# Patient Record
Sex: FEMALE | Race: WHITE | NOT HISPANIC OR LATINO | Employment: FULL TIME | URBAN - METROPOLITAN AREA
[De-identification: names, ages, dates, MRNs, and addresses within clinical notes are randomized per-mention and may not be internally consistent; named-entity substitution may affect disease eponyms.]

---

## 2017-07-05 NOTE — TELEPHONE ENCOUNTER
Fax received from pharmacy for refill of Lisinopril.  Last pap 6/27/16  Next appt 9/7/17 devin   Chart scanned

## 2017-07-20 RX ORDER — LISINOPRIL AND HYDROCHLOROTHIAZIDE 20; 12.5 MG/1; MG/1
1 TABLET ORAL DAILY
Qty: 30 TABLET | Refills: 0 | Status: SHIPPED | OUTPATIENT
Start: 2017-07-20 | End: 2017-10-01 | Stop reason: SDUPTHER

## 2017-09-07 ENCOUNTER — OFFICE VISIT (OUTPATIENT)
Dept: OBSTETRICS AND GYNECOLOGY | Facility: CLINIC | Age: 37
End: 2017-09-07

## 2017-09-07 VITALS
HEIGHT: 72 IN | DIASTOLIC BLOOD PRESSURE: 82 MMHG | SYSTOLIC BLOOD PRESSURE: 124 MMHG | BODY MASS INDEX: 39.68 KG/M2 | WEIGHT: 293 LBS

## 2017-09-07 DIAGNOSIS — N63.0 BREAST LUMP: ICD-10-CM

## 2017-09-07 DIAGNOSIS — Z01.419 WELL WOMAN EXAM WITH ROUTINE GYNECOLOGICAL EXAM: Primary | ICD-10-CM

## 2017-09-07 PROCEDURE — 99395 PREV VISIT EST AGE 18-39: CPT | Performed by: NURSE PRACTITIONER

## 2017-09-07 NOTE — PROGRESS NOTES
[facility], [place], operated by [facility]  Routine Annual Visit    2017    Patient: Loree Johnson          MR#:[id number]      History of Present Illness    37 y.o. female  who presents for annual exam. Last annual 2016 with . Pap negative HPV positive. Using tubal ligation for contraception. Reports monthly periods but very heavy .Reports soaking through tampons and pads every several hours. Has been on OCPs and mirena in the past. Did not do well with mirena per pt. She has no other complaints today.    Patient's last menstrual period was 2017 (within days).  Obstetric History:  OB History      Para Term  AB TAB SAB Ectopic Multiple Living    5 4 4  1  1   4         Menstrual History:     Patient's last menstrual period was 2017 (within days).       Sexual History:       ________________________________________  There is no problem list on file for this patient.      Past Medical History:   Diagnosis Date   • Dermoid cyst     Left ovary, status post cystectomy   • History of  section     ×4   • Hypertension    • Obesity, Class III, BMI 40-49.9 (morbid obesity)        Past Surgical History:   Procedure Laterality Date   •  SECTION  2007    Dr. Hsieh   •  SECTION  2012    Dr. Hsieh   •  SECTION PRIMARY  2003    Dr. Colunga   •  SECTION WITH TUBAL Bilateral 2015    Dr. Hsieh   • ELBOW PROCEDURE      [facility]   • LAPAROSCOPIC OVARIAN CYSTECTOMY Right 2003    Dr. Hsieh dermoid tumor       History   Smoking Status   • Never Smoker   Smokeless Tobacco   • Never Used       Family History   Problem Relation Age of Onset   • Throat cancer Mother    • Hypertension Mother    • Hypertension Maternal Grandmother    • Throat cancer Maternal Grandfather    • Ovarian cancer Cousin    • Lymphoma Cousin    • Hypertension Maternal Aunt    • No Known Problems Daughter    • No Known Problems Daughter    • No Known Problems  Daughter    • No Known Problems Son        Prior to Admission medications    Medication Sig Start Date End Date Taking? Authorizing Provider   lisinopril-hydrochlorothiazide (PRINZIDE,ZESTORETIC) 20-12.5 MG per tablet Take 1 tablet by mouth Daily. Take 1 tablet daily. 7/20/17   CASSI Webb   baclofen (LIORESAL) 10 MG tablet Take one tablet po bid prn pain or spasm (One in the AM and repeat in 8 hours) 5/17/17 9/7/17  CASSI Casillas   cyclobenzaprine (FLEXERIL) 10 MG tablet Take 1/2 tab - 1 tablet at HS prn pain/spasm 5/17/17 9/7/17  CASSI Casillas   HYDROcodone-acetaminophen (NORCO) 5-325 MG per tablet Take one or two tablets every 4-6 hours as needed for pain 5/17/17 9/7/17  CASSI Casillas     ________________________________________    Current contraception: tubal ligation  History of abnormal Pap smear: yes - pap neg + HPV  Family history of uterine or ovarian cancer: no  Family History of colon cancer/colon polyps: no  History of abnormal mammogram: no  History of abnormal lipids: no    The following portions of the patient's history were reviewed and updated as appropriate: allergies, current medications, past family history, past medical history, past social history, past surgical history and problem list.    Review of Systems   Constitutional: Negative.    HENT: Negative.    Eyes: Negative for visual disturbance.   Respiratory: Negative for cough, shortness of breath and wheezing.    Cardiovascular: Negative for chest pain, palpitations and leg swelling.   Gastrointestinal: Negative for abdominal distention, abdominal pain, blood in stool, constipation, diarrhea, nausea and vomiting.   Endocrine: Negative for cold intolerance and heat intolerance.   Genitourinary: Positive for menstrual problem. Negative for difficulty urinating, dyspareunia, dysuria, frequency, genital sores, hematuria, pelvic pain, urgency, vaginal bleeding, vaginal discharge and vaginal pain.  "  Musculoskeletal: Negative.    Skin: Negative.    Neurological: Negative for dizziness, weakness, light-headedness, numbness and headaches.   Hematological: Negative.    Psychiatric/Behavioral: Negative.    Breasts: negative for lumps skin changes, dimpling, swelling, nipple changes/discharge bilaterally         Objective   Physical Exam    /82  Ht 71.5\" (181.6 cm)  Wt (!) 329 lb (149 kg)  LMP 08/21/2017 (Within Days)  Breastfeeding? No  BMI 45.25 kg/m2   BP Readings from Last 3 Encounters:   09/07/17 124/82   05/17/17 136/91   07/15/16 132/82      Wt Readings from Last 3 Encounters:   09/07/17 (!) 329 lb (149 kg)   05/17/17 (!) 319 lb (145 kg)   07/15/16 (!) 316 lb (143 kg)        BMI: Estimated body mass index is 45.25 kg/(m^2) as calculated from the following:    Height as of this encounter: 71.5\" (181.6 cm).    Weight as of this encounter: 329 lb (149 kg).      General:   alert, appears stated age and cooperative   Heart: regular rate and rhythm, S1, S2 normal, no murmur, click, rub or gallop   Lungs: clear to auscultation bilaterally   Abdomen: soft, non-tender, without masses or organomegaly   Breast: negative findings: normal in size and symmetry, normal contour with no evidence of flattening or dimpling, skin normal, nipples everted without rashes or discharge, no palpable axillary lymphadenopathy, . and positive findings: round, mobile and tender nodule located on the left upper outer quadrant   Vulva: normal   Vagina: normal mucosa   Cervix: no bleeding following Pap, no cervical motion tenderness and no lesions   Uterus: normal size, mobile, non-tender or normal shape and consistency   Adnexa: exam limited by habitus     Assessment:    1. Normal annual exam. Await pap results. Pt aware if HPV + again- colpo to be arranged  2. Breast lump- bilateral diagnostic mammogram with U/S prn to be arranged   3. Menorrhagia- consult with Dr. Colunga; pt interested in ablation  4.  Counseled on healthy " lifestyle modifications and self breast exams.      Plan:    []  Rx:   [x]  Mammogram request made  [x]  PAP done  []  Occult fecal blood test ([facility])  []  Labs:   []  GC/Chl/TV  []  DEXA scan   []  Referral for colonoscopy:           CASSI Vides  9/7/2017 8:53 AM

## 2017-09-12 LAB
CYTOLOGIST CVX/VAG CYTO: NORMAL
CYTOLOGY CVX/VAG DOC THIN PREP: NORMAL
DX ICD CODE: NORMAL
HIV 1 & 2 AB SER-IMP: NORMAL
HPV I/H RISK 4 DNA CVX QL PROBE+SIG AMP: NEGATIVE
OTHER STN SPEC: NORMAL
PATH REPORT.FINAL DX SPEC: NORMAL
STAT OF ADQ CVX/VAG CYTO-IMP: NORMAL

## 2017-09-13 ENCOUNTER — HOSPITAL ENCOUNTER (OUTPATIENT)
Dept: MAMMOGRAPHY | Facility: HOSPITAL | Age: 37
Discharge: HOME | End: 2017-09-13
Admitting: NURSE PRACTITIONER

## 2017-09-13 ENCOUNTER — TELEPHONE (OUTPATIENT)
Dept: OBSTETRICS AND GYNECOLOGY | Facility: CLINIC | Age: 37
End: 2017-09-13

## 2017-09-13 ENCOUNTER — HOSPITAL ENCOUNTER (OUTPATIENT)
Dept: ULTRASOUND IMAGING | Facility: HOSPITAL | Age: 37
Discharge: HOME | End: 2017-09-13

## 2017-09-13 DIAGNOSIS — N63.0 BREAST LUMP: ICD-10-CM

## 2017-09-13 PROCEDURE — G0204 DX MAMMO INCL CAD BI: HCPCS

## 2017-09-13 PROCEDURE — 76642 ULTRASOUND BREAST LIMITED: CPT

## 2017-09-13 NOTE — TELEPHONE ENCOUNTER
----- Message from CASSI Webb sent at 9/13/2017  9:36 AM EDT -----  Please inform patient her pap smear returned negative (normal) and HPV negative. Thank you.

## 2017-09-19 ENCOUNTER — TELEPHONE (OUTPATIENT)
Dept: OBSTETRICS AND GYNECOLOGY | Facility: CLINIC | Age: 37
End: 2017-09-19

## 2017-10-02 RX ORDER — LISINOPRIL AND HYDROCHLOROTHIAZIDE 20; 12.5 MG/1; MG/1
TABLET ORAL
Qty: 30 TABLET | Refills: 0 | Status: SHIPPED | OUTPATIENT
Start: 2017-10-02 | End: 2017-10-25

## 2017-10-25 ENCOUNTER — CONSULT (OUTPATIENT)
Dept: OBSTETRICS AND GYNECOLOGY | Facility: CLINIC | Age: 37
End: 2017-10-25

## 2017-10-25 VITALS
HEIGHT: 72 IN | DIASTOLIC BLOOD PRESSURE: 90 MMHG | WEIGHT: 293 LBS | SYSTOLIC BLOOD PRESSURE: 138 MMHG | BODY MASS INDEX: 39.68 KG/M2

## 2017-10-25 DIAGNOSIS — I10 ESSENTIAL HYPERTENSION: ICD-10-CM

## 2017-10-25 DIAGNOSIS — N93.9 ABNORMAL UTERINE BLEEDING (AUB): Primary | ICD-10-CM

## 2017-10-25 PROCEDURE — 99213 OFFICE O/P EST LOW 20 MIN: CPT | Performed by: OBSTETRICS & GYNECOLOGY

## 2017-10-25 RX ORDER — LISINOPRIL AND HYDROCHLOROTHIAZIDE 20; 12.5 MG/1; MG/1
1 TABLET ORAL DAILY
Qty: 30 TABLET | Refills: 12 | Status: SHIPPED | OUTPATIENT
Start: 2017-10-25 | End: 2017-12-20 | Stop reason: SDUPTHER

## 2017-10-25 NOTE — PROGRESS NOTES
[facility]     10/25/2017      Patient:  Loree Johnson   MR#:[id number]    Office note    CC: heavy periods     Subjective     History of Present Illness  37 y.o. female  presents for consult on endometrial ablation.  For the past 6 months or so the patient reports exceptionally heavy periods lasting 8 days with heavy flow through 8 of those days requiring double protection the patient had an ultrasound last year that was essentially unremarkable.  The patient denies any intermenstrual bleeding and states that her cycles are extremely regular.  The patient knows of people that have had endometrial ablation and she wishes to have that procedure performed.    The patient had normal blood pressure with her last annual check and wanted to go of her antihypertensive medication.  We discussed her blood pressures today and we'll reinstitute her lisinopril that offered good control of her pressure previously      Patient Active Problem List   Diagnosis   • Essential hypertension   • Abnormal uterine bleeding (AUB)   • Essential hypertension       Past Medical History:   Diagnosis Date   • Dermoid cyst     Left ovary, status post cystectomy   • History of  section     ×4   • Hypertension    • Obesity, Class III, BMI 40-49.9 (morbid obesity)        Past Surgical History:   Procedure Laterality Date   •  SECTION  2007    Dr. Hsieh   •  SECTION  2012    Dr. Hsieh   •  SECTION PRIMARY  2003    Dr. Colunga   •  SECTION WITH TUBAL Bilateral 2015    Dr. Hsieh   • ELBOW PROCEDURE      [facility]   • LAPAROSCOPIC OVARIAN CYSTECTOMY Right 2003    Dr. Hsieh dermoid tumor       Obstetric History:  OB History      Para Term  AB Living    5 4 4  1 4    SAB TAB Ectopic Multiple Live Births    1    4         Menstrual History:     Patient's last menstrual period was 10/18/2017.       #: 1, Date: , Sex: None, Weight: None, GA:  None, Delivery: None, Apgar1: None, Apgar5: None, Living: None, Birth Comments: None    #: 2, Date: 03, Sex: Female, Weight: 6 lb 8.2 oz (2.954 kg), GA: 40w4d, Delivery: , Low Transverse, Apgar1: 9, Apgar5: 9, Living: Living, Birth Comments: None    #: 3, Date: 07, Sex: Male, Weight: 8 lb 13 oz (3.997 kg), GA: 38w0d, Delivery: , Low Transverse, Apgar1: 8, Apgar5: 9, Living: Living, Birth Comments: None    #: 4, Date: 12, Sex: Female, Weight: 7 lb 12.9 oz (3.541 kg), GA: 39w1d, Delivery: , Low Transverse, Apgar1: 8, Apgar5: 9, Living: Living, Birth Comments: None    #: 5, Date: 04/07/15, Sex: Female, Weight: 5 lb 4.7 oz (2.401 kg), GA: 37w3d, Delivery: , Low Transverse, Apgar1: 8, Apgar5: 8, Living: Living, Birth Comments: None      Family History   Problem Relation Age of Onset   • Throat cancer Mother 54   • Hypertension Mother    • Lung cancer Mother 54   • Hypertension Maternal Grandmother    • Throat cancer Maternal Grandfather 60   • Ovarian cancer Cousin 28   • Lymphoma Cousin    • Hypertension Maternal Aunt    • No Known Problems Daughter    • No Known Problems Daughter    • No Known Problems Daughter    • No Known Problems Son    • Breast cancer Neg Hx    • Uterine cancer Neg Hx    • Colon cancer Neg Hx    • Melanoma Neg Hx    • Prostate cancer Neg Hx        Social History   Substance Use Topics   • Smoking status: Never Smoker   • Smokeless tobacco: Never Used   • Alcohol use Yes      Comment: x2 annually       Review of patient's allergies indicates no known allergies.      Current Outpatient Prescriptions:   •  lisinopril-hydrochlorothiazide (ZESTORETIC) 20-12.5 MG per tablet, Take 1 tablet by mouth Daily., Disp: 30 tablet, Rfl: 12    The following portions of the patient's history were reviewed and updated as appropriate: allergies, current medications, past family history, past medical history, past social history, past surgical history and problem  "list.    Review of Systems   Constitutional: Negative.    Respiratory: Negative.    Cardiovascular: Negative.    Gastrointestinal: Negative.    Genitourinary: Positive for menstrual problem.   Psychiatric/Behavioral: Negative.        BP Readings from Last 3 Encounters:   10/25/17 138/90   09/07/17 124/82   05/17/17 136/91      Wt Readings from Last 3 Encounters:   10/25/17 (!) 330 lb (150 kg)   09/07/17 (!) 329 lb (149 kg)   05/17/17 (!) 319 lb (145 kg)      BMI: Estimated body mass index is 45.38 kg/(m^2) as calculated from the following:    Height as of this encounter: 71.5\" (181.6 cm).    Weight as of this encounter: 330 lb (150 kg).  BSA: Estimated body surface area is 2.62 meters squared as calculated from the following:    Height as of this encounter: 71.5\" (181.6 cm).    Weight as of this encounter: 330 lb (150 kg).    Objective   Physical Exam   Constitutional: She is oriented to person, place, and time. She appears well-developed and well-nourished.   Cardiovascular: Normal rate and regular rhythm.    Pulmonary/Chest: Effort normal and breath sounds normal.   Abdominal: Soft. Bowel sounds are normal. She exhibits no distension and no mass. There is no tenderness.   Morbid obesity     Genitourinary: Vagina normal and uterus normal.   Genitourinary Comments: No bleeding     Neurological: She is alert and oriented to person, place, and time.   Psychiatric: She has a normal mood and affect. Her behavior is normal. Judgment and thought content normal.   Nursing note and vitals reviewed.        Assessment/Plan     Loree was seen today for consult.    Diagnoses and all orders for this visit:    Abnormal uterine bleeding (AUB)  -     Case Request: Hysteroscopy D&C endometrial ablation via [facility]  - Previous tubal ligation.    The procedure is discussed with the patient.  We discussed the risks including but not limited to bleeding, infection and damage to internal organs.    Understanding of the surgical " procedure is voiced.    Essential hypertension    Other orders  -     lisinopril-hydrochlorothiazide (ZESTORETIC) 20-12.5 MG per tablet; Take 1 tablet by mouth Daily.          No Follow-up on file.        Diaz Colunga MD   10/25/2017 2:40 PM

## 2017-10-31 ENCOUNTER — TELEPHONE (OUTPATIENT)
Dept: OBSTETRICS AND GYNECOLOGY | Facility: CLINIC | Age: 37
End: 2017-10-31

## 2017-10-31 NOTE — TELEPHONE ENCOUNTER
----- Message from CASSI Webb sent at 10/30/2017  3:58 PM EDT -----  I have attempted to reach this patient without success.   Please inform pt her diagnostic mammogram and U/S returned negative. If she has any concerns please let me know- we can send her to breast specialist for further evaluation of the area if she would like. Otherwise, routine mammogram is recommended.

## 2017-12-15 ENCOUNTER — OFFICE VISIT (OUTPATIENT)
Dept: OBSTETRICS AND GYNECOLOGY | Facility: CLINIC | Age: 37
End: 2017-12-15

## 2017-12-15 VITALS
SYSTOLIC BLOOD PRESSURE: 124 MMHG | HEIGHT: 71 IN | DIASTOLIC BLOOD PRESSURE: 80 MMHG | WEIGHT: 293 LBS | BODY MASS INDEX: 41.02 KG/M2

## 2017-12-15 DIAGNOSIS — I10 ESSENTIAL HYPERTENSION: ICD-10-CM

## 2017-12-15 DIAGNOSIS — E66.01 OBESITY, MORBID, BMI 40.0-49.9 (HCC): ICD-10-CM

## 2017-12-15 DIAGNOSIS — N93.9 ABNORMAL UTERINE BLEEDING (AUB): Primary | ICD-10-CM

## 2017-12-15 PROCEDURE — 99213 OFFICE O/P EST LOW 20 MIN: CPT | Performed by: OBSTETRICS & GYNECOLOGY

## 2017-12-15 NOTE — PROGRESS NOTES
[facility] ([facility]     12/15/2017      Patient:  Loree Johnson   MR#:[id number]    Office note    CC: Preoperative consult for endometrial ablation    Subjective     History of Present Illness  37 y.o. female  with continued problematic heavy bleeding.  She reports for the past 8 months exceptionally heavy periods lasting 8 days with heavy flow through 8 of those days requiring double protection.   The patient had an ultrasound last year that was essentially unremarkable.  The patient denies any intermenstrual bleeding and states that her cycles are extremely regular.   The patient is scheduled for endometrial ablation 17    Patient Active Problem List   Diagnosis   • Essential hypertension   • Abnormal uterine bleeding (AUB)   • Essential hypertension       Past Medical History:   Diagnosis Date   • Dermoid cyst     Left ovary, status post cystectomy   • History of  section     ×4   • Hypertension    • Obesity, Class III, BMI 40-49.9 (morbid obesity)        Past Surgical History:   Procedure Laterality Date   •  SECTION  2007    Dr. Hsieh   •  SECTION  2012    Dr. Hsieh   •  SECTION PRIMARY  2003    Dr. Colunga   •  SECTION WITH TUBAL Bilateral 2015    Dr. Hsieh   • ELBOW PROCEDURE      [facility]   • LAPAROSCOPIC OVARIAN CYSTECTOMY Right 2003    Dr. Hsieh dermoid tumor       Obstetric History:  OB History      Para Term  AB Living    5 4 4  1 4    SAB TAB Ectopic Multiple Live Births    1    4         Menstrual History:     Patient's last menstrual period was 2017.       #: 1, Date: , Sex: None, Weight: None, GA: None, Delivery: None, Apgar1: None, Apgar5: None, Living: None, Birth Comments: None    #: 2, Date: 03, Sex: Female, Weight: 2954 g (6 lb 8.2 oz), GA: 40w4d, Delivery: , Low Transverse, Apgar1: 9, Apgar5: 9, Living: Living, Birth Comments: None    #: 3, Date: 07, Sex:  Male, Weight: 3997 g (8 lb 13 oz), GA: 38w0d, Delivery: , Low Transverse, Apgar1: 8, Apgar5: 9, Living: Living, Birth Comments: None    #: 4, Date: 12, Sex: Female, Weight: 3541 g (7 lb 12.9 oz), GA: 39w1d, Delivery: , Low Transverse, Apgar1: 8, Apgar5: 9, Living: Living, Birth Comments: None    #: 5, Date: 04/07/15, Sex: Female, Weight: 2401 g (5 lb 4.7 oz), GA: 37w3d, Delivery: , Low Transverse, Apgar1: 8, Apgar5: 8, Living: Living, Birth Comments: None      Family History   Problem Relation Age of Onset   • Throat cancer Mother 54   • Hypertension Mother    • Lung cancer Mother 54   • Hypertension Maternal Grandmother    • Throat cancer Maternal Grandfather 60   • Ovarian cancer Cousin 28   • Lymphoma Cousin    • Hypertension Maternal Aunt    • No Known Problems Daughter    • No Known Problems Daughter    • No Known Problems Daughter    • No Known Problems Son    • Breast cancer Neg Hx    • Uterine cancer Neg Hx    • Colon cancer Neg Hx    • Melanoma Neg Hx    • Prostate cancer Neg Hx        Social History   Substance Use Topics   • Smoking status: Never Smoker   • Smokeless tobacco: Never Used   • Alcohol use Yes      Comment: x2 annually       Review of patient's allergies indicates no known allergies.      Current Outpatient Prescriptions:   •  lisinopril-hydrochlorothiazide (ZESTORETIC) 20-12.5 MG per tablet, Take 1 tablet by mouth Daily., Disp: 30 tablet, Rfl: 12    The following portions of the patient's history were reviewed and updated as appropriate: allergies, current medications, past family history, past medical history, past social history, past surgical history and problem list.    Review of Systems   Constitutional: Positive for fatigue.   Respiratory: Negative.    Cardiovascular: Negative.    Gastrointestinal: Negative.    Genitourinary: Positive for menstrual problem and vaginal bleeding.   Psychiatric/Behavioral: Negative.        BP Readings from Last 3  "Encounters:   12/15/17 124/80   12/11/17 126/81   10/25/17 138/90      Wt Readings from Last 3 Encounters:   12/15/17 (!) 150 kg (331 lb)   12/11/17 (!) 145 kg (319 lb)   10/25/17 (!) 150 kg (330 lb)      BMI: Estimated body mass index is 46.17 kg/(m^2) as calculated from the following:    Height as of this encounter: 180.3 cm (71\").    Weight as of this encounter: 150 kg (331 lb).  BSA: Estimated body surface area is 2.61 meters squared as calculated from the following:    Height as of this encounter: 180.3 cm (71\").    Weight as of this encounter: 150 kg (331 lb).    Objective   Physical Exam   Constitutional: She is oriented to person, place, and time. She appears well-developed and well-nourished.   HENT:   Head: Normocephalic and atraumatic.   Cardiovascular: Normal rate.    Pulmonary/Chest: Effort normal.   Abdominal: Soft. Bowel sounds are normal. She exhibits no distension. There is no tenderness.   Morbidly obese     Genitourinary: Vagina normal and uterus normal.   Genitourinary Comments: Normal uterus   Exam very limited by habitus   Neurological: She is alert and oriented to person, place, and time.   Skin: Skin is warm and dry.   Psychiatric: She has a normal mood and affect. Her behavior is normal. Judgment and thought content normal.   Nursing note and vitals reviewed.        Assessment/Plan     Loree was seen today for consult.    Diagnoses and all orders for this visit:    Abnormal uterine bleeding (AUB)  - Proceed with hysteroscopy D&C endometrial ablation scheduled 12/21/17    Essential hypertension    Obesity, morbid, BMI 40.0-49.9          No Follow-up on file.        Diaz Colunga MD   12/15/2017 2:32 PM  "

## 2017-12-19 ENCOUNTER — APPOINTMENT (OUTPATIENT)
Dept: PREADMISSION TESTING | Facility: HOSPITAL | Age: 37
End: 2017-12-19

## 2017-12-20 ENCOUNTER — APPOINTMENT (OUTPATIENT)
Dept: PREADMISSION TESTING | Facility: HOSPITAL | Age: 37
End: 2017-12-20

## 2017-12-20 VITALS
BODY MASS INDEX: 39.68 KG/M2 | HEART RATE: 95 BPM | RESPIRATION RATE: 20 BRPM | SYSTOLIC BLOOD PRESSURE: 135 MMHG | HEIGHT: 72 IN | DIASTOLIC BLOOD PRESSURE: 86 MMHG | OXYGEN SATURATION: 100 % | WEIGHT: 293 LBS | TEMPERATURE: 98 F

## 2017-12-20 LAB
ANION GAP SERPL CALCULATED.3IONS-SCNC: 14.7 MMOL/L
BUN BLD-MCNC: 11 MG/DL (ref 6–20)
BUN/CREAT SERPL: 15.1 (ref 7–25)
CALCIUM SPEC-SCNC: 9 MG/DL (ref 8.6–10.5)
CHLORIDE SERPL-SCNC: 96 MMOL/L (ref 98–107)
CO2 SERPL-SCNC: 26.3 MMOL/L (ref 22–29)
CREAT BLD-MCNC: 0.73 MG/DL (ref 0.57–1)
DEPRECATED RDW RBC AUTO: 44.4 FL (ref 37–54)
ERYTHROCYTE [DISTWIDTH] IN BLOOD BY AUTOMATED COUNT: 16.1 % (ref 11.7–13)
GFR SERPL CREATININE-BSD FRML MDRD: 91 ML/MIN/1.73
GLUCOSE BLD-MCNC: 136 MG/DL (ref 65–99)
HCG SERPL QL: NEGATIVE
HCT VFR BLD AUTO: 38.5 % (ref 35.6–45.5)
HGB BLD-MCNC: 11.9 G/DL (ref 11.9–15.5)
MCH RBC QN AUTO: 23.7 PG (ref 26.9–32)
MCHC RBC AUTO-ENTMCNC: 30.9 G/DL (ref 32.4–36.3)
MCV RBC AUTO: 76.5 FL (ref 80.5–98.2)
PLATELET # BLD AUTO: 292 10*3/MM3 (ref 140–500)
PMV BLD AUTO: 9.5 FL (ref 6–12)
POTASSIUM BLD-SCNC: 3.3 MMOL/L (ref 3.5–5.2)
RBC # BLD AUTO: 5.03 10*6/MM3 (ref 3.9–5.2)
SODIUM BLD-SCNC: 137 MMOL/L (ref 136–145)
WBC NRBC COR # BLD: 7.43 10*3/MM3 (ref 4.5–10.7)

## 2017-12-20 PROCEDURE — 36415 COLL VENOUS BLD VENIPUNCTURE: CPT

## 2017-12-20 PROCEDURE — 93010 ELECTROCARDIOGRAM REPORT: CPT | Performed by: INTERNAL MEDICINE

## 2017-12-20 PROCEDURE — 85027 COMPLETE CBC AUTOMATED: CPT | Performed by: OBSTETRICS & GYNECOLOGY

## 2017-12-20 PROCEDURE — 84703 CHORIONIC GONADOTROPIN ASSAY: CPT | Performed by: OBSTETRICS & GYNECOLOGY

## 2017-12-20 PROCEDURE — 80048 BASIC METABOLIC PNL TOTAL CA: CPT | Performed by: OBSTETRICS & GYNECOLOGY

## 2017-12-20 PROCEDURE — 93005 ELECTROCARDIOGRAM TRACING: CPT

## 2017-12-20 RX ORDER — LISINOPRIL AND HYDROCHLOROTHIAZIDE 20; 12.5 MG/1; MG/1
1 TABLET ORAL NIGHTLY
COMMUNITY
End: 2018-10-25

## 2017-12-20 RX ORDER — POTASSIUM CHLORIDE 750 MG/1
40 CAPSULE, EXTENDED RELEASE ORAL ONCE
Status: COMPLETED | OUTPATIENT
Start: 2017-12-21 | End: 2017-12-21

## 2017-12-20 NOTE — DISCHARGE INSTRUCTIONS
Take the following medications the morning of surgery with a small sip of water: NONE  STOP PREOP ANY ANTIINFLAMMATORY, HERBAL, IBUPROFEN, ETC  ARRIVE TO THE MAIN SURGERY DESK THE DAY OF YOUR SURGERY BY 530AM.        General Instructions:  • Do not eat solid food after midnight the night before surgery.  • You may drink clear liquids day of surgery but must stop at least one hour before your hospital arrival time.  • It is beneficial for you to have a clear drink that contains carbohydrates the day of surgery.  We suggest a 12 to 20 ounce bottle of Gatorade or Powerade for non-diabetic patients or a 12 to 20 ounce bottle of G2 or Powerade Zero for diabetic patients. (Pediatric patients, are not advised to drink a 12 to 20 ounce carbohydrate drink)    Clear liquids are liquids you can see through.  Nothing red in color.     Plain water                               Sports drinks  Sodas                                   Gelatin (Jell-O)  Fruit juices without pulp such as white grape juice and apple juice  Popsicles that contain no fruit or yogurt  Tea or coffee (no cream or milk added)  Gatorade / Powerade  G2 / Powerade Zero    • Infants may have breast milk up to four hours before surgery.  • Infants drinking formula may drink formula up to six hours before surgery.   • Patients who avoid smoking, chewing tobacco and alcohol for 4 weeks prior to surgery have a reduced risk of post-operative complications.  Quit smoking as many days before surgery as you can.  • Do not smoke, use chewing tobacco or drink alcohol the day of surgery.   • If applicable bring your C-PAP/ BI-PAP machine.  • Bring any papers given to you in the doctor’s office.  • Wear clean comfortable clothes and socks.  • Do not wear contact lenses or make-up.  Bring a case for your glasses.   • Bring crutches or walker if applicable.  • Remove all piercings.  Leave jewelry and any other valuables at home.  • The Pre-Admission Testing nurse will  instruct you to bring medications if unable to obtain an accurate list in Pre-Admission Testing.        If you were given a blood bank ID arm band remember to bring it with you the day of surgery.    Preventing a Surgical Site Infection:  • For 2 to 3 days before surgery, avoid shaving with a razor because the razor can irritate skin and make it easier to develop an infection.  • The night prior to surgery sleep in a clean bed with clean clothing.  Do not allow pets to sleep with you.  • Shower on the morning of surgery using a fresh bar of anti-bacterial soap (such as Dial) and clean washcloth.  Dry with a clean towel and dress in clean clothing.  • Ask your surgeon if you will be receiving antibiotics prior to surgery.  • Make sure you, your family, and all healthcare providers clean their hands with soap and water or an alcohol based hand  before caring for you or your wound.    Day of surgery:  Upon arrival, a Pre-op nurse and Anesthesiologist will review your health history, obtain vital signs, and answer questions you may have.  The only belongings needed at this time will be your home medications and if applicable your C-PAP/BI-PAP machine.  If you are staying overnight your family can leave the rest of your belongings in the car and bring them to your room later.  A Pre-op nurse will start an IV and you may receive medication in preparation for surgery, including something to help you relax.  Your family will be able to see you in the Pre-op area.  While you are in surgery your family should notify the waiting room  if they leave the waiting room area and provide a contact phone number.    Please be aware that surgery does come with discomfort.  We want to make every effort to control your discomfort so please discuss any uncontrolled symptoms with your nurse.   Your doctor will most likely have prescribed pain medications.      If you are going home after surgery you will receive  individualized written care instructions before being discharged.  A responsible adult must drive you to and from the hospital on the day of your surgery and stay with you for 24 hours.    If you are staying overnight following surgery, you will be transported to your hospital room following the recovery period.  [facility] has all private rooms.    If you have any questions please call Pre-Admission Testing at [phone].  Deductibles and co-payments are collected on the day of service. Please be prepared to pay the required co-pay, deductible or deposit on the day of service as defined by your plan.

## 2017-12-21 ENCOUNTER — ANESTHESIA (OUTPATIENT)
Dept: PERIOP | Facility: HOSPITAL | Age: 37
End: 2017-12-21

## 2017-12-21 ENCOUNTER — ANESTHESIA EVENT (OUTPATIENT)
Dept: PERIOP | Facility: HOSPITAL | Age: 37
End: 2017-12-21

## 2017-12-21 ENCOUNTER — HOSPITAL ENCOUNTER (OUTPATIENT)
Facility: HOSPITAL | Age: 37
Setting detail: OBSERVATION
Discharge: HOME | End: 2017-12-21
Attending: OBSTETRICS & GYNECOLOGY | Admitting: OBSTETRICS & GYNECOLOGY

## 2017-12-21 VITALS
WEIGHT: 293 LBS | DIASTOLIC BLOOD PRESSURE: 78 MMHG | TEMPERATURE: 97.5 F | HEART RATE: 74 BPM | BODY MASS INDEX: 39.68 KG/M2 | HEIGHT: 72 IN | RESPIRATION RATE: 16 BRPM | SYSTOLIC BLOOD PRESSURE: 123 MMHG | OXYGEN SATURATION: 98 %

## 2017-12-21 DIAGNOSIS — N93.9 ABNORMAL UTERINE BLEEDING (AUB): Primary | ICD-10-CM

## 2017-12-21 PROCEDURE — G0378 HOSPITAL OBSERVATION PER HR: HCPCS

## 2017-12-21 PROCEDURE — 25010000003 CEFAZOLIN IN DEXTROSE 2-4 GM/100ML-% SOLUTION: Performed by: OBSTETRICS & GYNECOLOGY

## 2017-12-21 PROCEDURE — 25010000002 PROMETHAZINE PER 50 MG: Performed by: NURSE ANESTHETIST, CERTIFIED REGISTERED

## 2017-12-21 PROCEDURE — 25010000002 DEXAMETHASONE PER 1 MG: Performed by: ANESTHESIOLOGY

## 2017-12-21 PROCEDURE — 25010000002 PROPOFOL 10 MG/ML EMULSION: Performed by: ANESTHESIOLOGY

## 2017-12-21 PROCEDURE — 25010000002 MORPHINE SULFATE (PF) 2 MG/ML SOLUTION: Performed by: ANESTHESIOLOGY

## 2017-12-21 PROCEDURE — 88305 TISSUE EXAM BY PATHOLOGIST: CPT | Performed by: OBSTETRICS & GYNECOLOGY

## 2017-12-21 PROCEDURE — 58563 HYSTEROSCOPY ABLATION: CPT | Performed by: OBSTETRICS & GYNECOLOGY

## 2017-12-21 PROCEDURE — 25010000002 FENTANYL CITRATE (PF) 100 MCG/2ML SOLUTION: Performed by: ANESTHESIOLOGY

## 2017-12-21 PROCEDURE — 25010000002 MIDAZOLAM PER 1 MG: Performed by: ANESTHESIOLOGY

## 2017-12-21 PROCEDURE — 25010000002 KETOROLAC TROMETHAMINE PER 15 MG: Performed by: ANESTHESIOLOGY

## 2017-12-21 PROCEDURE — 25010000002 ONDANSETRON PER 1 MG: Performed by: ANESTHESIOLOGY

## 2017-12-21 RX ORDER — EPHEDRINE SULFATE 50 MG/ML
5 INJECTION, SOLUTION INTRAVENOUS ONCE AS NEEDED
Status: DISCONTINUED | OUTPATIENT
Start: 2017-12-21 | End: 2017-12-21 | Stop reason: HOSPADM

## 2017-12-21 RX ORDER — SODIUM CHLORIDE 0.9 % (FLUSH) 0.9 %
1-10 SYRINGE (ML) INJECTION AS NEEDED
Status: DISCONTINUED | OUTPATIENT
Start: 2017-12-21 | End: 2017-12-21 | Stop reason: HOSPADM

## 2017-12-21 RX ORDER — CEFAZOLIN SODIUM 2 G/100ML
2 INJECTION, SOLUTION INTRAVENOUS ONCE
Status: COMPLETED | OUTPATIENT
Start: 2017-12-21 | End: 2017-12-21

## 2017-12-21 RX ORDER — KETOROLAC TROMETHAMINE 30 MG/ML
INJECTION, SOLUTION INTRAMUSCULAR; INTRAVENOUS AS NEEDED
Status: DISCONTINUED | OUTPATIENT
Start: 2017-12-21 | End: 2017-12-21 | Stop reason: SURG

## 2017-12-21 RX ORDER — MIDAZOLAM HYDROCHLORIDE 1 MG/ML
1 INJECTION INTRAMUSCULAR; INTRAVENOUS
Status: DISCONTINUED | OUTPATIENT
Start: 2017-12-21 | End: 2017-12-21 | Stop reason: HOSPADM

## 2017-12-21 RX ORDER — PROMETHAZINE HYDROCHLORIDE 25 MG/ML
INJECTION, SOLUTION INTRAMUSCULAR; INTRAVENOUS AS NEEDED
Status: DISCONTINUED | OUTPATIENT
Start: 2017-12-21 | End: 2017-12-21 | Stop reason: SURG

## 2017-12-21 RX ORDER — SODIUM CHLORIDE, SODIUM LACTATE, POTASSIUM CHLORIDE, CALCIUM CHLORIDE 600; 310; 30; 20 MG/100ML; MG/100ML; MG/100ML; MG/100ML
100 INJECTION, SOLUTION INTRAVENOUS CONTINUOUS
Status: DISCONTINUED | OUTPATIENT
Start: 2017-12-21 | End: 2017-12-21 | Stop reason: HOSPADM

## 2017-12-21 RX ORDER — FENTANYL CITRATE 50 UG/ML
50 INJECTION, SOLUTION INTRAMUSCULAR; INTRAVENOUS
Status: DISCONTINUED | OUTPATIENT
Start: 2017-12-21 | End: 2017-12-21 | Stop reason: HOSPADM

## 2017-12-21 RX ORDER — ONDANSETRON 2 MG/ML
4 INJECTION INTRAMUSCULAR; INTRAVENOUS ONCE AS NEEDED
Status: DISCONTINUED | OUTPATIENT
Start: 2017-12-21 | End: 2017-12-21 | Stop reason: HOSPADM

## 2017-12-21 RX ORDER — NALOXONE HCL 0.4 MG/ML
0.4 VIAL (ML) INJECTION AS NEEDED
Status: DISCONTINUED | OUTPATIENT
Start: 2017-12-21 | End: 2017-12-21 | Stop reason: HOSPADM

## 2017-12-21 RX ORDER — LABETALOL HYDROCHLORIDE 5 MG/ML
5 INJECTION, SOLUTION INTRAVENOUS
Status: DISCONTINUED | OUTPATIENT
Start: 2017-12-21 | End: 2017-12-21 | Stop reason: HOSPADM

## 2017-12-21 RX ORDER — IBUPROFEN 800 MG/1
800 TABLET ORAL EVERY 8 HOURS PRN
Qty: 30 TABLET | Refills: 3 | Status: SHIPPED | OUTPATIENT
Start: 2017-12-21 | End: 2018-01-24

## 2017-12-21 RX ORDER — MIDAZOLAM HYDROCHLORIDE 1 MG/ML
2 INJECTION INTRAMUSCULAR; INTRAVENOUS
Status: DISCONTINUED | OUTPATIENT
Start: 2017-12-21 | End: 2017-12-21 | Stop reason: HOSPADM

## 2017-12-21 RX ORDER — FENTANYL CITRATE 50 UG/ML
25 INJECTION, SOLUTION INTRAMUSCULAR; INTRAVENOUS
Status: DISCONTINUED | OUTPATIENT
Start: 2017-12-21 | End: 2017-12-21 | Stop reason: HOSPADM

## 2017-12-21 RX ORDER — FAMOTIDINE 10 MG/ML
20 INJECTION, SOLUTION INTRAVENOUS ONCE
Status: COMPLETED | OUTPATIENT
Start: 2017-12-21 | End: 2017-12-21

## 2017-12-21 RX ORDER — ONDANSETRON 2 MG/ML
INJECTION INTRAMUSCULAR; INTRAVENOUS AS NEEDED
Status: DISCONTINUED | OUTPATIENT
Start: 2017-12-21 | End: 2017-12-21 | Stop reason: SURG

## 2017-12-21 RX ORDER — SODIUM CHLORIDE, SODIUM LACTATE, POTASSIUM CHLORIDE, CALCIUM CHLORIDE 600; 310; 30; 20 MG/100ML; MG/100ML; MG/100ML; MG/100ML
9 INJECTION, SOLUTION INTRAVENOUS CONTINUOUS
Status: DISCONTINUED | OUTPATIENT
Start: 2017-12-21 | End: 2017-12-21 | Stop reason: HOSPADM

## 2017-12-21 RX ORDER — OXYCODONE HYDROCHLORIDE AND ACETAMINOPHEN 5; 325 MG/1; MG/1
1-2 TABLET ORAL EVERY 4 HOURS PRN
Qty: 30 TABLET | Refills: 0 | Status: SHIPPED | OUTPATIENT
Start: 2017-12-21 | End: 2018-01-24

## 2017-12-21 RX ORDER — MORPHINE SULFATE 2 MG/ML
2 INJECTION, SOLUTION INTRAMUSCULAR; INTRAVENOUS
Status: DISCONTINUED | OUTPATIENT
Start: 2017-12-21 | End: 2017-12-21 | Stop reason: HOSPADM

## 2017-12-21 RX ORDER — HYDROCODONE BITARTRATE AND ACETAMINOPHEN 5; 325 MG/1; MG/1
1 TABLET ORAL ONCE AS NEEDED
Status: DISCONTINUED | OUTPATIENT
Start: 2017-12-21 | End: 2017-12-21 | Stop reason: HOSPADM

## 2017-12-21 RX ORDER — DEXAMETHASONE SODIUM PHOSPHATE 10 MG/ML
INJECTION INTRAMUSCULAR; INTRAVENOUS AS NEEDED
Status: DISCONTINUED | OUTPATIENT
Start: 2017-12-21 | End: 2017-12-21 | Stop reason: SURG

## 2017-12-21 RX ORDER — PROPOFOL 10 MG/ML
VIAL (ML) INTRAVENOUS AS NEEDED
Status: DISCONTINUED | OUTPATIENT
Start: 2017-12-21 | End: 2017-12-21 | Stop reason: SURG

## 2017-12-21 RX ADMIN — HYDROCODONE BITARTRATE AND ACETAMINOPHEN 1 TABLET: 5; 325 TABLET ORAL at 09:03

## 2017-12-21 RX ADMIN — FAMOTIDINE 20 MG: 10 INJECTION, SOLUTION INTRAVENOUS at 06:40

## 2017-12-21 RX ADMIN — SODIUM CHLORIDE, POTASSIUM CHLORIDE, SODIUM LACTATE AND CALCIUM CHLORIDE: 600; 310; 30; 20 INJECTION, SOLUTION INTRAVENOUS at 07:20

## 2017-12-21 RX ADMIN — PROMETHAZINE HYDROCHLORIDE 12.5 MG: 25 INJECTION INTRAMUSCULAR; INTRAVENOUS at 08:08

## 2017-12-21 RX ADMIN — KETOROLAC TROMETHAMINE 30 MG: 30 INJECTION, SOLUTION INTRAMUSCULAR; INTRAVENOUS at 07:30

## 2017-12-21 RX ADMIN — PROPOFOL 200 MG: 10 INJECTION, EMULSION INTRAVENOUS at 07:20

## 2017-12-21 RX ADMIN — FENTANYL CITRATE 25 MCG: 50 INJECTION INTRAMUSCULAR; INTRAVENOUS at 09:05

## 2017-12-21 RX ADMIN — ALFENTANIL HYDROCHLORIDE 250 MCG: 500 INJECTION INTRAVENOUS at 07:41

## 2017-12-21 RX ADMIN — ALFENTANIL HYDROCHLORIDE 250 MCG: 500 INJECTION INTRAVENOUS at 07:55

## 2017-12-21 RX ADMIN — FENTANYL CITRATE 25 MCG: 50 INJECTION INTRAMUSCULAR; INTRAVENOUS at 08:49

## 2017-12-21 RX ADMIN — MORPHINE SULFATE 2 MG: 2 INJECTION, SOLUTION INTRAMUSCULAR; INTRAVENOUS at 09:47

## 2017-12-21 RX ADMIN — SODIUM CHLORIDE, POTASSIUM CHLORIDE, SODIUM LACTATE AND CALCIUM CHLORIDE 9 ML/HR: 600; 310; 30; 20 INJECTION, SOLUTION INTRAVENOUS at 06:41

## 2017-12-21 RX ADMIN — POTASSIUM CHLORIDE 40 MEQ: 750 CAPSULE, EXTENDED RELEASE ORAL at 06:19

## 2017-12-21 RX ADMIN — FENTANYL CITRATE 25 MCG: 50 INJECTION INTRAMUSCULAR; INTRAVENOUS at 08:35

## 2017-12-21 RX ADMIN — DEXAMETHASONE SODIUM PHOSPHATE 6 MG: 10 INJECTION INTRAMUSCULAR; INTRAVENOUS at 07:30

## 2017-12-21 RX ADMIN — ONDANSETRON 4 MG: 2 INJECTION INTRAMUSCULAR; INTRAVENOUS at 07:30

## 2017-12-21 RX ADMIN — Medication 2 MG: at 06:41

## 2017-12-21 RX ADMIN — ALFENTANIL HYDROCHLORIDE 500 MCG: 500 INJECTION INTRAVENOUS at 07:20

## 2017-12-21 RX ADMIN — CEFAZOLIN SODIUM 2 G: 2 INJECTION, SOLUTION INTRAVENOUS at 07:30

## 2017-12-21 RX ADMIN — FENTANYL CITRATE 25 MCG: 50 INJECTION INTRAMUSCULAR; INTRAVENOUS at 09:30

## 2017-12-21 NOTE — ANESTHESIA PREPROCEDURE EVALUATION
Anesthesia Evaluation     Patient summary reviewed and Nursing notes reviewed   NPO Solid Status: > 8 hours  NPO Liquid Status: > 8 hours     Airway   Mallampati: II  Dental - normal exam     Pulmonary - normal exam   Cardiovascular - normal exam    (+) hypertension well controlled,       Neuro/Psych  GI/Hepatic/Renal/Endo    (+) obesity, morbid obesity,     Musculoskeletal     Abdominal    Substance History      OB/GYN          Other                                        Anesthesia Plan    ASA 3     general     intravenous induction   Anesthetic plan and risks discussed with patient.

## 2017-12-21 NOTE — PLAN OF CARE
Problem: Patient Care Overview (Adult)  Goal: Plan of Care Review  Outcome: Ongoing (interventions implemented as appropriate)   12/21/17 0602   Coping/Psychosocial Response Interventions   Plan Of Care Reviewed With patient   Patient Care Overview   Progress progress toward functional goals as expected     Goal: Adult Individualization and Mutuality  Outcome: Ongoing (interventions implemented as appropriate)    Goal: Discharge Needs Assessment  Outcome: Ongoing (interventions implemented as appropriate)   12/21/17 0602   Discharge Needs Assessment   Concerns To Be Addressed no discharge needs identified       Problem: Perioperative Period (Adult)  Goal: Signs and Symptoms of Listed Potential Problems Will be Absent or Manageable (Perioperative Period)  Outcome: Ongoing (interventions implemented as appropriate)   12/21/17 0602   Perioperative Period   Problems Assessed (Perioperative Period) all   Problems Present (Perioperative Period) infection

## 2017-12-21 NOTE — ANESTHESIA PROCEDURE NOTES
Airway  Urgency: elective    Date/Time: 12/21/2017 7:30 AM    General Information and Staff    Patient location during procedure: OR  Anesthesiologist: JORJE NOEL    Indications and Patient Condition    Preoxygenated: yes  Mask difficulty assessment: 1 - vent by mask    Final Airway Details  Final airway type: supraglottic airway      Successful airway: classic  Size 4    Number of attempts at approach: 1

## 2017-12-21 NOTE — OP NOTE
DILATATION AND CURETTAGE HYSTEROSCOPY NOVASURE ENDOMETRIAL ABLATION  Progress Note    Loree Johnson  12/21/2017    Pre-op Diagnosis:   Abnormal uterine bleeding (AUB) [N93.9]       Post-Op Diagnosis Codes:     * Abnormal uterine bleeding (AUB) [N93.9]    Procedure/CPT® Codes:      Procedure(s):  DILATATION AND CURETTAGE HYSTEROSCOPY WITH NOVASURE ENDOMETRIAL ABLATION    Surgeon(s):  Diaz Colunga MD    Anesthesia: General    Staff:   Circulator: Grecia Marie RN  Scrub Person: Adamaris Acosta    Estimated Blood Loss: 100 mL    Urine Voided: * No values recorded between 12/21/2017  7:14 AM and 12/21/2017  8:12 AM *    Specimens:                  ID Type Source Tests Collected by Time Destination   A : endometrial curettingd Tissue Endometrial Curettings TISSUE EXAM Diaz Colunga MD 12/21/2017 0756          Drains:           Findings: Normal cavity    Complications: None    Description of the procedure:    The patient was taken to the operating room and placed in supine position.  General anesthesia was administered and the patient was prepped and draped in the usual sterile fashion with her legs placed in Lucas stirrups.  Padding was provided so that her legs did not make contact with the Lucas stirrups.    Weighted speculum was placed in the vagina and the cervix was grasped anteriorly with a single-tooth tenaculum.  Sequential dilators were used to dilate the cervix to 16 Estonian.  The uterus sounds to 8 cm.  The MyoSure scope was inserted and the cavity was distended with normal saline.  Findings were consistent with a normal cavity without any cavitary lesions or impingements on the lining from submucosal fibroids.  The tubal ostia are identified bilaterally.    A #1 curet is taken and  the cavity is systematically curetted.  Fragments of endometrium are retained for pathological examination.    NovaSure device is inserted and set at a length of 5.5 cm and deployed at a width of 2.8 cm.  A 84 second cycle  is completed without any complications.  Very scant bleeding is noted thereafter.  The patient's awakened and taken to the recovery room in stable condition.    Two cervical lacerations resulted from the procedure and stitches were placed in both lacerations on the anterior and posterior cervix.  Thereafter bleeding appeared very scant        Diaz Colunga MD     Date: 12/21/2017  Time: 8:35 AM

## 2017-12-21 NOTE — H&P
[facility]  History & Physical    2017    Patient: Loree Johnson          MR#:[id number]    Chief complaint:  Heavy menstrual bleeding     Subjective     Patient is a 37 y.o. female  with continued problematic heavy bleeding.  She reports for the past 8 months exceptionally heavy periods lasting 8 days with heavy flow through 8 of those days requiring double protection.   The patient had an ultrasound last year that was essentially unremarkable.  The patient denies any intermenstrual bleeding and states that her cycles are extremely regular.   The patient is scheduled for endometrial ablation 17      Patient Active Problem List   Diagnosis   • Essential hypertension   • Abnormal uterine bleeding (AUB)   • Obesity, morbid, BMI 40.0-49.9       Past Medical History:   Diagnosis Date   • Dermoid cyst     Left ovary, status post cystectomy   • Headache    • History of  section     ×4   • Hypertension    • Laceration of finger 2017    CUT RIGHT PINKY FINGER ON MICROWAVE - HAS 5 STITCHES, HAD TETANUS SHOT   • Low back pain    • Obesity, Class III, BMI 40-49.9 (morbid obesity)    • Spinal headache    • Varicose vein of leg     AND GYN       Past Surgical History:   Procedure Laterality Date   •  SECTION  2007    Dr. Hsieh   •  SECTION  2012    Dr. Hsieh   •  SECTION PRIMARY  2003    Dr. Colunga   •  SECTION WITH TUBAL Bilateral 2015    Dr. Hsieh   • ELBOW PROCEDURE      [facility]   • LAPAROSCOPIC OVARIAN CYSTECTOMY Right 2003    Dr. Hsieh dermoid tumor       Obstetric History:  OB History      Para Term  AB Living    5 4 4  1 4    SAB TAB Ectopic Multiple Live Births    1    4         Menstrual History:     Patient's last menstrual period was 2017.       #: 1, Date: , Sex: None, Weight: None, GA: None, Delivery: None, Apgar1: None, Apgar5: None, Living: None, Birth Comments: None    #:  2, Date: 03, Sex: Female, Weight: 2954 g (6 lb 8.2 oz), GA: 40w4d, Delivery: , Low Transverse, Apgar1: 9, Apgar5: 9, Living: Living, Birth Comments: None    #: 3, Date: 07, Sex: Male, Weight: 3997 g (8 lb 13 oz), GA: 38w0d, Delivery: , Low Transverse, Apgar1: 8, Apgar5: 9, Living: Living, Birth Comments: None    #: 4, Date: 12, Sex: Female, Weight: 3541 g (7 lb 12.9 oz), GA: 39w1d, Delivery: , Low Transverse, Apgar1: 8, Apgar5: 9, Living: Living, Birth Comments: None    #: 5, Date: 04/07/15, Sex: Female, Weight: 2401 g (5 lb 4.7 oz), GA: 37w3d, Delivery: , Low Transverse, Apgar1: 8, Apgar5: 8, Living: Living, Birth Comments: None      Family History   Problem Relation Age of Onset   • Throat cancer Mother 54   • Hypertension Mother    • Lung cancer Mother 54   • Hypertension Maternal Grandmother    • Throat cancer Maternal Grandfather 60   • Ovarian cancer Cousin 28   • Lymphoma Cousin    • Hypertension Maternal Aunt    • No Known Problems Daughter    • No Known Problems Daughter    • No Known Problems Daughter    • No Known Problems Son    • Breast cancer Neg Hx    • Uterine cancer Neg Hx    • Colon cancer Neg Hx    • Melanoma Neg Hx    • Prostate cancer Neg Hx    • Malig Hyperthermia Neg Hx        Social History   Substance Use Topics   • Smoking status: Never Smoker   • Smokeless tobacco: Never Used   • Alcohol use No       Review of patient's allergies indicates no known allergies.    No current facility-administered medications for this encounter.     Current Outpatient Prescriptions:   •  lisinopril-hydrochlorothiazide (PRINZIDE,ZESTORETIC) 20-12.5 MG per tablet, Take 1 tablet by mouth Every Night., Disp: , Rfl:     Review of Systems  Review of Systems   Constitutional: Positive for fatigue.   Respiratory: Negative.    Cardiovascular: Negative.    Gastrointestinal: Negative.    Genitourinary: Positive for menstrual problem and vaginal bleeding.    Psychiatric/Behavioral: Negative.        Objective     Vital Signs  Temp:  [98 °F (36.7 °C)] 98 °F (36.7 °C)  Heart Rate:  [95] 95  Resp:  [20] 20  BP: (135)/(86) 135/86    Physical Exam:  Physical Exam   Constitutional: She is oriented to person, place, and time. She appears well-developed and well-nourished.   Cardiovascular: Normal rate and regular rhythm.    Pulmonary/Chest: Effort normal and breath sounds normal.   Abdominal: Soft. Bowel sounds are normal. She exhibits no distension and no mass. There is no tenderness.   Genitourinary: Vagina normal and uterus normal.   Neurological: She is alert and oriented to person, place, and time.   Psychiatric: She has a normal mood and affect. Her behavior is normal. Judgment and thought content normal.   Nursing note and vitals reviewed.      Labs:  Reviewed.  Hgb 11.9, K 3.3     Assessment/Plan     1. Abnormal uterine bleeding     Plan:  Hysteroscopy, Dilatation & curettge, endometrial ablation with novasure           Active Problems:    * No active hospital problems. *      Reviewed the surgical procedure with patient.  I discussed the risks including but not limited to bleeding, infection and damage to internal organs.  Understanding of the procedure is voiced.     Diaz Colunga MD  12/20/17  8:12 PM      Patient Care Team:  CASSI Remy as PCP - General (Family Medicine)

## 2017-12-21 NOTE — ANESTHESIA POSTPROCEDURE EVALUATION
Patient: Loree Johnson    Procedure Summary     Date Anesthesia Start Anesthesia Stop Room / Location    12/21/17 0720 0816  [facility] /  [facility]       Procedure Diagnosis Surgeon Provider    DILATATION AND CURETTAGE HYSTEROSCOPY WITH NOVASURE ENDOMETRIAL ABLATION (N/A Vagina) Abnormal uterine bleeding (AUB)  (Abnormal uterine bleeding (AUB) [N93.9]) MD Shalonda Taylor MD          Anesthesia Type: general  Last vitals  BP   124/76 (12/21/17 0845)   Temp   36.4 °C (97.5 °F) (12/21/17 0814)   Pulse   71 (12/21/17 0845)   Resp   16 (12/21/17 0845)     SpO2   100 % (12/21/17 0845)     Post Anesthesia Care and Evaluation    Patient location during evaluation: PACU  Patient participation: complete - patient participated  Level of consciousness: awake and alert  Pain management: adequate  Airway patency: patent  Anesthetic complications: No anesthetic complications    Cardiovascular status: acceptable  Respiratory status: acceptable  Hydration status: acceptable    Comments: --------------------            12/21/17 0845     --------------------   BP:       124/76     Pulse:      71       Resp:       16       Temp:                SpO2:      100%     --------------------

## 2017-12-22 ENCOUNTER — TELEPHONE (OUTPATIENT)
Dept: OBSTETRICS AND GYNECOLOGY | Facility: CLINIC | Age: 37
End: 2017-12-22

## 2017-12-22 LAB
LAB AP CASE REPORT: NORMAL
Lab: NORMAL
PATH REPORT.FINAL DX SPEC: NORMAL
PATH REPORT.GROSS SPEC: NORMAL

## 2017-12-22 NOTE — TELEPHONE ENCOUNTER
Pt called with severe headache and cramping post ablation.Advised pt to stop narcotic and take 800 mg of Motrin every 8 hours x 2 days.Pt advised to call on call doctor or ER if pain worsens over the [email or web address]

## 2018-01-24 ENCOUNTER — OFFICE VISIT (OUTPATIENT)
Dept: OBSTETRICS AND GYNECOLOGY | Age: 38
End: 2018-01-24

## 2018-01-24 VITALS
DIASTOLIC BLOOD PRESSURE: 78 MMHG | WEIGHT: 293 LBS | BODY MASS INDEX: 39.68 KG/M2 | HEIGHT: 72 IN | SYSTOLIC BLOOD PRESSURE: 128 MMHG

## 2018-01-24 DIAGNOSIS — Z09 POSTOP CHECK: Primary | ICD-10-CM

## 2018-01-24 DIAGNOSIS — Z13.89 SCREENING FOR BLOOD OR PROTEIN IN URINE: ICD-10-CM

## 2018-01-24 LAB
BILIRUB BLD-MCNC: NEGATIVE MG/DL
CLARITY, POC: ABNORMAL
COLOR UR: ABNORMAL
GLUCOSE UR STRIP-MCNC: NEGATIVE MG/DL
KETONES UR QL: NEGATIVE
LEUKOCYTE EST, POC: NEGATIVE
NITRITE UR-MCNC: NEGATIVE MG/ML
PH UR: 5.5 [PH] (ref 5–8)
PROT UR STRIP-MCNC: ABNORMAL MG/DL
RBC # UR STRIP: ABNORMAL /UL
SP GR UR: 1.02 (ref 1–1.03)
UROBILINOGEN UR QL: NORMAL

## 2018-01-24 PROCEDURE — 99213 OFFICE O/P EST LOW 20 MIN: CPT | Performed by: OBSTETRICS & GYNECOLOGY

## 2018-01-24 NOTE — PROGRESS NOTES
Postop Visit    2018    Patient: Loree Johnson          MR#:[id number]    History of Present Illness    37 y.o. female  status post hysteroscopy D&C endometrial ablation.  Patient states she tolerated the procedure well without major symptoms.  She does report just completed a menstrual cycle.  She states it seemed a little heavier than expected but states it's diminishing today..       ________________________________________  Patient Active Problem List   Diagnosis   • Essential hypertension   • Abnormal uterine bleeding (AUB)   • Obesity, morbid, BMI 40.0-49.9       Past Medical History:   Diagnosis Date   • Dermoid cyst     Left ovary, status post cystectomy   • Headache    • History of  section     ×4   • Hypertension    • Laceration of finger 2017    CUT RIGHT PINKY FINGER ON MICROWAVE - HAS 5 STITCHES, HAD TETANUS SHOT   • Low back pain    • Obesity, Class III, BMI 40-49.9 (morbid obesity)    • Spinal headache    • Varicose vein of leg     AND GYN       Past Surgical History:   Procedure Laterality Date   •  SECTION  2007    Dr. Hsieh   •  SECTION  2012    Dr. Hsieh   •  SECTION PRIMARY  2003    Dr. Colunga   •  SECTION WITH TUBAL Bilateral 2015    Dr. Hsieh   • D&C HYSTEROSCOPY ENDOMETRIAL ABLATION N/A 2017    Procedure: DILATATION AND CURETTAGE HYSTEROSCOPY WITH NOVASURE ENDOMETRIAL ABLATION;  Surgeon: Diaz Colunga MD;  Location: [facility];  Service:    • ELBOW PROCEDURE      [facility]   • LAPAROSCOPIC OVARIAN CYSTECTOMY Right 2003    Dr. Hsieh dermoid tumor       History   Smoking Status   • Never Smoker   Smokeless Tobacco   • Never Used       has a current medication list which includes the following prescription(s): lisinopril-hydrochlorothiazide.  ________________________________________  Review of Systems   Constitutional: Negative.    Respiratory: Negative.    Cardiovascular: Negative.   "  Gastrointestinal: Negative.    Genitourinary: Positive for menstrual problem, vaginal bleeding and vaginal discharge.        Last menses very heavy     Psychiatric/Behavioral: Negative.             Objective       /78  Ht 181.6 cm (71.5\")  Wt (!) 149 kg (328 lb)  LMP  (LMP Unknown)  Breastfeeding? No  BMI 45.11 kg/m2   BP Readings from Last 3 Encounters:   01/24/18 128/78   12/21/17 123/78   12/20/17 135/86      Wt Readings from Last 3 Encounters:   01/24/18 (!) 149 kg (328 lb)   12/21/17 (!) 150 kg (330 lb 3 oz)   12/20/17 (!) 150 kg (329 lb 11.2 oz)      BMI: Estimated body mass index is 45.11 kg/(m^2) as calculated from the following:    Height as of this encounter: 181.6 cm (71.5\").    Weight as of this encounter: 149 kg (328 lb).    EXAM     General:     Patient appears well in NAD  Abdomen: Soft, NT, +BS, no acute findings  Pelvic:  Light menses  Incision: Dry, clean, intact healing without signs of infection  Ext:  No cyanosis, edema 1-2    Assessment:    1.  Normal postop check  2.  Essential hypertension     Plan:  Follow-up for persistent heavy bleeding  Return in about 7 months (around 9/8/2018) for Annual GYN exam.         Diaz Colunga MD  1/24/2018 8:38 AM    "

## 2018-10-25 RX ORDER — LISINOPRIL AND HYDROCHLOROTHIAZIDE 20; 12.5 MG/1; MG/1
1 TABLET ORAL DAILY
Qty: 30 TABLET | Refills: 1 | Status: SHIPPED | OUTPATIENT
Start: 2018-10-25 | End: 2018-12-30 | Stop reason: SDUPTHER

## 2018-12-03 ENCOUNTER — TELEPHONE (OUTPATIENT)
Dept: OBSTETRICS AND GYNECOLOGY | Age: 38
End: 2018-12-03

## 2018-12-31 RX ORDER — LISINOPRIL AND HYDROCHLOROTHIAZIDE 20; 12.5 MG/1; MG/1
1 TABLET ORAL DAILY
Qty: 30 TABLET | Refills: 0 | Status: SHIPPED | OUTPATIENT
Start: 2018-12-31 | End: 2019-03-18 | Stop reason: SDUPTHER

## 2019-03-18 RX ORDER — LISINOPRIL AND HYDROCHLOROTHIAZIDE 20; 12.5 MG/1; MG/1
1 TABLET ORAL DAILY
Qty: 30 TABLET | Refills: 1 | Status: SHIPPED | OUTPATIENT
Start: 2019-03-18

## 2021-04-16 ENCOUNTER — BULK ORDERING (OUTPATIENT)
Dept: CASE MANAGEMENT | Facility: OTHER | Age: 41
End: 2021-04-16

## 2021-04-16 DIAGNOSIS — Z23 IMMUNIZATION DUE: ICD-10-CM

## 2021-11-02 ENCOUNTER — TELEPHONE (OUTPATIENT)
Dept: OBSTETRICS AND GYNECOLOGY | Age: 41
End: 2021-11-02

## 2021-11-02 NOTE — TELEPHONE ENCOUNTER
Pt no-showed her appt 11/1/21 with Dr. Colunga. I just left a voicemail notifying pt that she missed her appt and that she is advised that if she makes another appt and does not come- she can be dismissed from the practice.

## 2023-07-18 PROBLEM — R73.03 PREDIABETES: Status: ACTIVE | Noted: 2020-11-03

## 2023-07-18 PROBLEM — E66.01 MORBID OBESITY: Status: ACTIVE | Noted: 2017-03-16

## 2023-07-18 PROBLEM — F32.A DEPRESSIVE DISORDER: Status: ACTIVE | Noted: 2022-02-01

## 2024-04-30 ENCOUNTER — PATIENT ROUNDING (BHMG ONLY) (OUTPATIENT)
Dept: URGENT CARE | Facility: CLINIC | Age: 44
End: 2024-04-30
Payer: COMMERCIAL

## 2024-04-30 NOTE — ED NOTES
Thank you for letting us care for you in your recent visit to our urgent care center. We would love to hear about your experience with us. Was this the first time you have visited our location?    We’re always looking for ways to make our patients’ experiences even better. Do you have any recommendations on ways we may improve?     I appreciate you taking the time to respond. Please be on the lookout for a survey about your recent visit from [employer] via text or email. We would greatly appreciate if you could fill that out and turn it back in. We want your voice to be heard and we value your feedback.   Thank you for choosing [facility] for your healthcare needs.

## 2024-12-03 ENCOUNTER — PATIENT ROUNDING (BHMG ONLY) (OUTPATIENT)
Dept: URGENT CARE | Facility: CLINIC | Age: 44
End: 2024-12-03
Payer: COMMERCIAL

## 2024-12-03 NOTE — ED NOTES
Thank you for letting us care for you in your recent visit to our urgent care center. We would love to hear about your experience with us. Was this the first time you have visited our location?    We’re always looking for ways to make our patients’ experiences even better. Do you have any recommendations on ways we may improve?     I appreciate you taking the time to respond. Please be on the lookout for a survey about your recent visit from [employer] via text or email. We would greatly appreciate if you could fill that out and turn it back in. We want your voice to be heard and we value your feedback.   Thank you for choosing [facility] for your healthcare needs.

## (undated) DEVICE — DRAPE,UNDERBUTTOCKS,PCH,STERILE: Brand: MEDLINE

## (undated) DEVICE — ST IRR CYSTO W/SPK 77IN LF

## (undated) DEVICE — GLV SURG BIOGEL LTX PF 7 1/2

## (undated) DEVICE — PAD SANI MAXI W/ADHS SNG WRP 11IN

## (undated) DEVICE — SEAL HYSTERSCOPE/OUTFLOW CHANNEL MYOSURE

## (undated) DEVICE — DRAPE,REIN 53X77,STERILE: Brand: MEDLINE

## (undated) DEVICE — NDL SPINE 20G 3 1/2 YEL STRL 1P/U

## (undated) DEVICE — LOU D & C HYSTEROSCOPY: Brand: MEDLINE INDUSTRIES, INC.

## (undated) DEVICE — STRAP STIRUP SLP RNG 19X3.5IN DISP

## (undated) DEVICE — SOL NACL 0.9PCT 1000ML

## (undated) DEVICE — SOL IRR NACL 0.9PCT 3000ML

## (undated) DEVICE — PROB ABL ENDOMTRL NOVASURE/G4 W/SURESND